# Patient Record
Sex: MALE | Race: WHITE | URBAN - METROPOLITAN AREA
[De-identification: names, ages, dates, MRNs, and addresses within clinical notes are randomized per-mention and may not be internally consistent; named-entity substitution may affect disease eponyms.]

---

## 2017-07-18 ENCOUNTER — TELEPHONE (OUTPATIENT)
Dept: FAMILY MEDICINE | Facility: CLINIC | Age: 32
End: 2017-07-18

## 2017-07-18 ENCOUNTER — OFFICE VISIT (OUTPATIENT)
Dept: FAMILY MEDICINE | Facility: CLINIC | Age: 32
End: 2017-07-18
Payer: COMMERCIAL

## 2017-07-18 VITALS
BODY MASS INDEX: 27.9 KG/M2 | WEIGHT: 199.25 LBS | TEMPERATURE: 98.7 F | OXYGEN SATURATION: 98 % | RESPIRATION RATE: 20 BRPM | HEIGHT: 71 IN | SYSTOLIC BLOOD PRESSURE: 131 MMHG | DIASTOLIC BLOOD PRESSURE: 84 MMHG | HEART RATE: 78 BPM

## 2017-07-18 DIAGNOSIS — F17.219 CIGARETTE NICOTINE DEPENDENCE WITH NICOTINE-INDUCED DISORDER: Primary | ICD-10-CM

## 2017-07-18 DIAGNOSIS — Z00.00 ROUTINE GENERAL MEDICAL EXAMINATION AT A HEALTH CARE FACILITY: Primary | ICD-10-CM

## 2017-07-18 PROCEDURE — 99395 PREV VISIT EST AGE 18-39: CPT | Performed by: FAMILY MEDICINE

## 2017-07-18 NOTE — TELEPHONE ENCOUNTER
Gave pt this info. He will look forward to hearing from [facility].  Faxed [facility] referral.  SHIKHA Sylvester

## 2017-07-18 NOTE — PROGRESS NOTES
SUBJECTIVE:   CC: Ede Simpson is an 32 year old male who presents for preventative health visit.     Physical   Annual:     Getting at least 3 servings of Calcium per day::  Yes    Bi-annual eye exam::  NO    Dental care twice a year::  Yes    Sleep apnea or symptoms of sleep apnea::  None    Diet::  Regular (no restrictions)    Frequency of exercise::  None    Taking medications regularly::  Yes    Medication side effects::  None    Additional concerns today::  No  Additional: pt has forms to fill out for medical card       Today's PHQ-2 Score:   PHQ-2 ( 1999 [employer]) 7/18/2017   Q1: Little interest or pleasure in doing things 0   Q2: Feeling down, depressed or hopeless 0   PHQ-2 Score 0   Q1: Little interest or pleasure in doing things Not at all   Q2: Feeling down, depressed or hopeless Not at all   PHQ-2 Score 0       Abuse: Current or Past(Physical, Sexual or Emotional)- No  Do you feel safe in your environment - Yes    Social History   Substance Use Topics     Smoking status: Current Every Day Smoker     Packs/day: 0.50     Types: Cigarettes     Smokeless tobacco: Former User     Alcohol use Yes      Comment: 6-8 drinks a week          Standardized Alcohol Screening Questionnaire  AUDIT   Questions 0 1 2 3 4 Score   1. How often do you have a drink  containing alcohol? Never Monthly or less 2 to 4  times a  month 2 to 3  times a  week 4 or more  times a  week  3   2. How many drinks containing alcohol  do you have on a typical day when you are drinking? 1 or 2 3 or 4 5 or 6 7 to 9 10 or more  1   3. How often do you have more than five  or more drinks on one occasion? Never Less  than  monthly Monthly Weekly Daily or  almost  daily  1   4. How often during the last year have  you found that you were not able to stop drinking once you had started? Never Less  than  monthly Monthly Weekly Daily or  almost  daily  0   5. How often during the last year have  you failed to do what was normally expected of you  because of drinking? Never Less  than  monthly Monthly Weekly Daily or  almost  daily  0   6. How often during the last year have  you needed a first drink in the morning to get yourself going after a heavy drinking session? Never Less  than  monthly Monthly Weekly Daily or  almost  daily  0   7. How often during the last year have you had a feeling of guilt or remorse after drinking? Never Less  than  monthly Monthly Weekly Daily or  almost  daily  0   8. How often during the last year have  you been unable to remember what happened the night before because of your drinking? Never Less  than  monthly Monthly Weekly Daily or  almost  daily  0   9. Have you or someone else been  injured because of your drinking? No  Yes, but not in the last year  Yes,  during the  last year  0   10. Has a relative, friend, doctor or other health care worker been concerned about your drinking or suggested you cut down? No  Yes, but not in the last year  Yes,  during the  last year  0   Total  5   Scoring: A score of 7 for adult men is an indication of hazardous drinking (risk for physical or physiological harm); a score of 8 or more is an indication of an alcohol use disorder. A score of 5 or more for adult women  is an indication of hazardous drinking or an alcohol use disorder.     Last PSA: No results found for: PSA    Reviewed orders with patient. 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 Informed her needs to look up DOT providers online.     ROS:  C: NEGATIVE for fever, chills, change in weight  I: NEGATIVE for worrisome rashes, moles or lesions  E: NEGATIVE for vision changes or irritation  ENT: NEGATIVE for ear, mouth and  "throat problems  R: NEGATIVE for significant cough or SOB  CV: NEGATIVE for chest pain, palpitations or peripheral edema  GI: NEGATIVE for nausea, abdominal pain, heartburn, or change in bowel habits   male: negative for dysuria, hematuria, decreased urinary stream, erectile dysfunction, urethral discharge  M: NEGATIVE for significant arthralgias or myalgia  N: NEGATIVE for weakness, dizziness or paresthesias  E: NEGATIVE for temperature intolerance, skin/hair changes  P: NEGATIVE for changes in mood or affect    OBJECTIVE:   /84 (Cuff Size: Adult Regular)  Pulse 78  Temp 98.7  F (37.1  C) (Oral)  Resp 20  Ht 5' 11.25\" (1.81 m)  Wt 199 lb 4 oz (90.4 kg)  SpO2 98%  BMI 27.6 kg/m2    EXAM:  GENERAL: healthy, alert and no distress  EYES: Eyes grossly normal to inspection, PERRL and conjunctivae and sclerae normal  HENT: ear canals and TM's normal, nose and mouth without ulcers or lesions  NECK: no adenopathy, no asymmetry, masses, or scars and thyroid normal to palpation  RESP: lungs clear to auscultation - no rales, rhonchi or wheezes  CV: regular rate and rhythm, normal S1 S2, no S3 or S4, no murmur, click or rub, no peripheral edema and peripheral pulses strong  ABDOMEN: soft, nontender, no hepatosplenomegaly, no masses and bowel sounds normal   (male): normal male genitalia without lesions or urethral discharge, no hernia  MS: no gross musculoskeletal defects noted, no edema  SKIN: no suspicious lesions or rashes  NEURO: Normal strength and tone, mentation intact and speech normal  PSYCH: mentation appears normal, affect normal/bright    ASSESSMENT/PLAN:   (Z00.00) Routine general medical examination at a health care facility  (primary encounter diagnosis)  Comment: offered lipid, glucose and std screen, TDAP  Plan: he wishes to hold off on those.         COUNSELING:   Reviewed preventive health counseling, as reflected in patient instructions  Special attention given to:        Regular exercise    " "   Healthy diet/nutrition       Vision screening       Hearing screening        reports that he has been smoking Cigarettes.  He has been smoking about 0.50 packs per day. He has quit using smokeless tobacco.  Tobacco Cessation Action Plan: quit plan info given.  Estimated body mass index is 27.6 kg/(m^2) as calculated from the following:    Height as of this encounter: 5' 11.25\" (1.81 m).    Weight as of this encounter: 199 lb 4 oz (90.4 kg).   Weight management plan: Discussed healthy diet and exercise guidelines and patient will follow up in 12 months in clinic to re-evaluate.    Counseling Resources:  ATP IV Guidelines  Pooled Cohorts Equation Calculator  FRAX Risk Assessment  ICSI Preventive Guidelines  Dietary Guidelines for Americans, 2010  USDA's MyPlate  ASA Prophylaxis  Lung CA Screening    Washington Leiva MD  [facility]  Answers for HPI/ROS submitted by the patient on 7/18/2017   PHQ-2 Score: 0    "

## 2017-07-18 NOTE — MR AVS SNAPSHOT
After Visit Summary   7/18/2017    Ede Simpson    MRN: [id number]           Patient Information     Date Of Birth          1985        Visit Information        Provider Department      7/18/2017 9:40 AM Washington Leiva MD [facility]        Today's Diagnoses     Routine general medical examination at a health care facility    -  1      Care Instructions      Preventive Health Recommendations  Male Ages 26 - 39    Yearly exam:             See your health care provider every year in order to  o   Review health changes.   o   Discuss preventive care.    o   Review your medicines if your doctor has prescribed any.    You should be tested each year for STDs (sexually transmitted diseases), if you re at risk.     After age 35, talk to your provider about cholesterol testing. If you are at risk for heart disease, have your cholesterol tested at least every 5 years.     If you are at risk for diabetes, you should have a diabetes test (fasting glucose).  Shots: Get a flu shot each year. Get a tetanus shot every 10 years.     Nutrition:    Eat at least 5 servings of fruits and vegetables daily.     Eat whole-grain bread, whole-wheat pasta and brown rice instead of white grains and rice.     Talk to your provider about Calcium and Vitamin D.     Lifestyle    Exercise for at least 150 minutes a week (30 minutes a day, 5 days a week). This will help you control your weight and prevent disease.     Limit alcohol to one drink per day.     No smoking.     Wear sunscreen to prevent skin cancer.     See your dentist every six months for an exam and cleaning.             Follow-ups after your visit        Who to contact     If you have questions or need follow up information about today's clinic visit or your schedule please contact [facility] directly at [phone].  Normal or non-critical lab and imaging results will be communicated to you by MyChart, letter or phone within  "4 business days after the clinic has received the results. If you do not hear from us within 7 days, please contact the clinic through [facility] or phone. If you have a critical or abnormal lab result, we will notify you by phone as soon as possible.  Submit refill requests through [facility] or call your pharmacy and they will forward the refill request to us. Please allow 3 business days for your refill to be completed.          Additional Information About Your Visit        [facility] Information     [facility] lets you send messages to your doctor, view your test results, renew your prescriptions, schedule appointments and more. To sign up, go to [email or web address] . Click on \"Log in\" on the left side of the screen, which will take you to the Welcome page. Then click on \"Sign up Now\" on the right side of the page.     You will be asked to enter the access code listed below, as well as some personal information. Please follow the directions to create your username and password.     Your access code is: [email or web address]  Expires: 10/16/2017  9:51 AM     Your access code will  in 90 days. If you need help or a new code, please call your [facility] clinic or [phone].        Care EveryWhere ID     This is your Care EveryWhere ID. This could be used by other organizations to access your [facility] medical records  [id number]        Your Vitals Were     Pulse Temperature Respirations Height Pulse Oximetry BMI (Body Mass Index)    78 98.7  F (37.1  C) (Oral) 20 5' 11.25\" (1.81 m) 98% 27.6 kg/m2       Blood Pressure from Last 3 Encounters:   17 131/84   09/08/15 (!) 134/96   06/02/15 120/86    Weight from Last 3 Encounters:   17 199 lb 4 oz (90.4 kg)   06/02/15 195 lb 8 oz (88.7 kg)   12 175 lb (79.4 kg)              Today, you had the following     No orders found for display         Today's Medication Changes          These changes are accurate as of: 17  9:51 AM.  If you have any questions, ask " your nurse or doctor.               Stop taking these medicines if you haven't already. Please contact your care team if you have questions.     HYDROcodone-acetaminophen 5-325 MG per tablet   Commonly known as:  NORCO   Stopped by:  Washington Leiva MD                    Primary Care Provider    Physician No Ref-Primary       No address on file        Equal Access to Services     [facility]: Hadii aad ku hadasho Soomaali, waaxda luqadaha, qaybta kaalmada adeegyada, juvenal lealsukialtagracia mills . So [facility] [phone].    ATENCIÓN: Si habla español, tiene a torres disposición servicios gratuitos de asistencia lingüística. [facility] [phone].    We comply with applicable federal civil rights laws and [place] laws. We do not discriminate on the basis of race, color, national origin, age, disability sex, sexual orientation or gender identity.            Thank you!     Thank you for choosing [facility]  for your care. Our goal is always to provide you with excellent care. Hearing back from our patients is one way we can continue to improve our services. Please take a few minutes to complete the written survey that you may receive in the mail after your visit with us. Thank you!             Your Updated Medication List - Protect others around you: Learn how to safely use, store and throw away your medicines at www.disposemymeds.org.          This list is accurate as of: 7/18/17  9:51 AM.  Always use your most recent med list.                   Brand Name Dispense Instructions for use Diagnosis    IBUPROFEN PO      Take 400 mg by mouth.        TYLENOL 500 MG tablet   Generic drug:  acetaminophen      Take 500-1,000 mg by mouth every 6 hours as needed for mild pain

## 2017-07-18 NOTE — TELEPHONE ENCOUNTER
From hx- pt smokes 1/2 pack per day of cigarettes.  Sending to .  Please advise.  Pended quit plan referral. Do you want us to just go thru this with pt?  SHIKHA Sylvester

## 2017-07-18 NOTE — TELEPHONE ENCOUNTER
Reason for Call:  Other Smoking Cessation    Detailed comments: Pt would like a call back to discuss smoking cessation options. Pt forgot to discuss this during ov today.    Phone Number Patient can be reached at: [phone]    Best Time: Anytime    Can we leave a detailed message on this number? YES    Call taken on 7/18/2017 at 10:17 AM by Doreen West

## 2017-07-18 NOTE — TELEPHONE ENCOUNTER
Referral seems appropriate. Signed.  If needed he can see MTM and can consider meds if that is needed.

## 2017-07-18 NOTE — NURSING NOTE
"Chief Complaint   Patient presents with     Physical     pt is fasting        Initial /84 (Cuff Size: Adult Regular)  Pulse 78  Temp 98.7  F (37.1  C) (Oral)  Resp 20  Ht 5' 11.25\" (1.81 m)  Wt 199 lb 4 oz (90.4 kg)  SpO2 98%  BMI 27.6 kg/m2 Estimated body mass index is 27.6 kg/(m^2) as calculated from the following:    Height as of this encounter: 5' 11.25\" (1.81 m).    Weight as of this encounter: 199 lb 4 oz (90.4 kg).  Medication Reconciliation: complete     Monique Trent, ROBERT      "